# Patient Record
Sex: FEMALE | URBAN - METROPOLITAN AREA
[De-identification: names, ages, dates, MRNs, and addresses within clinical notes are randomized per-mention and may not be internally consistent; named-entity substitution may affect disease eponyms.]

---

## 2020-10-09 ENCOUNTER — OFFICE VISIT (OUTPATIENT)
Dept: URGENT CARE | Facility: CLINIC | Age: 40
End: 2020-10-09
Payer: COMMERCIAL

## 2020-10-09 VITALS
TEMPERATURE: 98.3 F | RESPIRATION RATE: 16 BRPM | OXYGEN SATURATION: 100 % | SYSTOLIC BLOOD PRESSURE: 119 MMHG | HEART RATE: 71 BPM | DIASTOLIC BLOOD PRESSURE: 65 MMHG

## 2020-10-09 DIAGNOSIS — Z20.822 SUSPECTED COVID-19 VIRUS INFECTION: Primary | ICD-10-CM

## 2020-10-09 PROCEDURE — 87635 SARS-COV-2 COVID-19 AMP PRB: CPT | Performed by: PHYSICIAN ASSISTANT

## 2020-10-09 PROCEDURE — 99203 OFFICE O/P NEW LOW 30 MIN: CPT | Performed by: PHYSICIAN ASSISTANT

## 2020-10-09 RX ORDER — ESCITALOPRAM OXALATE 10 MG/1
10 TABLET ORAL DAILY
COMMUNITY

## 2020-10-10 LAB — SARS-COV-2 RNA RESP QL NAA+PROBE: NEGATIVE

## 2025-02-28 ENCOUNTER — OFFICE VISIT (OUTPATIENT)
Dept: OBGYN CLINIC | Facility: CLINIC | Age: 45
End: 2025-02-28
Payer: COMMERCIAL

## 2025-02-28 VITALS
WEIGHT: 220 LBS | SYSTOLIC BLOOD PRESSURE: 168 MMHG | HEART RATE: 70 BPM | HEIGHT: 67 IN | DIASTOLIC BLOOD PRESSURE: 108 MMHG | BODY MASS INDEX: 34.53 KG/M2

## 2025-02-28 DIAGNOSIS — M54.12 LEFT CERVICAL RADICULOPATHY: Primary | ICD-10-CM

## 2025-02-28 PROCEDURE — 99204 OFFICE O/P NEW MOD 45 MIN: CPT | Performed by: ORTHOPAEDIC SURGERY

## 2025-02-28 RX ORDER — CYCLOBENZAPRINE HCL 10 MG
TABLET ORAL
COMMUNITY
Start: 2025-02-21

## 2025-02-28 RX ORDER — DICLOFENAC SODIUM 75 MG/1
75 TABLET, DELAYED RELEASE ORAL 2 TIMES DAILY
Qty: 30 TABLET | Refills: 0 | Status: SHIPPED | OUTPATIENT
Start: 2025-02-28 | End: 2025-03-15

## 2025-02-28 RX ORDER — METHYLPREDNISOLONE 4 MG/1
TABLET ORAL
COMMUNITY
Start: 2025-02-24

## 2025-02-28 RX ORDER — TIZANIDINE HYDROCHLORIDE 2 MG/1
CAPSULE, GELATIN COATED ORAL
COMMUNITY
Start: 2025-02-24

## 2025-02-28 NOTE — PROGRESS NOTES
Assessment/Plan:  1. Left cervical radiculopathy  Ambulatory referral to Physical Therapy    diclofenac (VOLTAREN) 75 mg EC tablet          Liset has left-sided neck pain consistent with acute cervical radiculopathy.  I advised her to stop the oral steroid due to her elevated blood pressure today.  She was also advised to call her PCP today and inform her of her elevated blood pressure and symptoms and discussed next treatment option.  She verbalized understanding of this.  If the blood pressure continues to increase or she develops chest pain or dizziness she will proceed to the emergency room.  And lieu of the steroids, we will start an oral anti-inflammatory medication and formal physical therapy for her.  Follow-up in 4 to 6 weeks for repeat evaluation.    Subjective:   Liset Fox is a 44 y.o. female who presents to the office for evaluation for acute onset of left-sided neck pain.  She denies any specific injury or trauma and woke up with significant discomfort in the left side of her neck rating down the arm.  She does work with horses and is not sure if she did 1 activity too much that may have increased this pain.  She has felt neck pain in the past but never this severe.  She did go to urgent care when the pain began and was given muscle relaxers and had an x-ray which was negative for any fractures or significant degenerative changes.  She has been taking Advil and Aleve as well.  She called her PCP and was placed on oral steroids and a different muscle relaxer a few days ago.  She feels like the steroids may have helped slightly but this did cause some adverse reaction of elevation of her blood pressure and flushing facially.  She did not take the last dose last evening of the steroid.  Today she has continued pain on the left side of her neck into her left shoulder blade and down her left arm.  She denies any weakness into the upper extremity but the numbness persists.  She cannot get  comfortable and needs to put her left arm over her head for any relief.    Review of Systems   Constitutional:  Negative for chills, fever and unexpected weight change.   HENT:  Negative for hearing loss, nosebleeds and sore throat.    Eyes:  Negative for pain, redness and visual disturbance.   Respiratory:  Negative for cough, shortness of breath and wheezing.    Cardiovascular:  Negative for chest pain, palpitations and leg swelling.   Gastrointestinal:  Negative for abdominal pain, nausea and vomiting.   Endocrine: Negative for polydipsia and polyuria.   Genitourinary:  Negative for dysuria and hematuria.   Musculoskeletal:         See HPI   Skin:  Negative for rash and wound.   Neurological:  Positive for numbness. Negative for dizziness and headaches.   Psychiatric/Behavioral:  Negative for decreased concentration and suicidal ideas. The patient is not nervous/anxious.          Past Medical History:   Diagnosis Date    Anxiety     Depression        Past Surgical History:   Procedure Laterality Date    KNEE SURGERY         Family History   Problem Relation Age of Onset    Cancer Mother         Passed away from brain cancer  at 64    Diabetes Mother         Type 1    Cancer Maternal Grandmother         Breast cancer    Cancer Paternal Grandmother         Recurring breast cancer    Cancer Maternal Aunt         Breast cancer and bone cancer       Social History     Occupational History    Not on file   Tobacco Use    Smoking status: Never    Smokeless tobacco: Never   Vaping Use    Vaping status: Never Used   Substance and Sexual Activity    Alcohol use: Yes    Drug use: Never    Sexual activity: Yes     Partners: Male     Birth control/protection: Male Sterilization         Current Outpatient Medications:     methylPREDNISolone 4 MG tablet therapy pack, , Disp: , Rfl:     cyclobenzaprine (FLEXERIL) 10 mg tablet, , Disp: , Rfl:     escitalopram (LEXAPRO) 10 mg tablet, Take 10 mg by mouth daily, Disp: , Rfl:      TiZANidine (ZANAFLEX) 2 MG capsule, , Disp: , Rfl:     No Known Allergies    Objective:  There were no vitals filed for this visit.         Ortho Exam    Physical Exam  Vitals and nursing note reviewed.   Constitutional:       Appearance: Normal appearance. She is well-developed.   HENT:      Head: Normocephalic and atraumatic.      Right Ear: External ear normal.      Left Ear: External ear normal.   Eyes:      General: No scleral icterus.     Extraocular Movements: Extraocular movements intact.      Conjunctiva/sclera: Conjunctivae normal.   Neck:        Comments: Positive Spurling's maneuver on the left.  Normal strength and sensation bilateral upper extremities.  Cardiovascular:      Rate and Rhythm: Normal rate.   Pulmonary:      Effort: Pulmonary effort is normal. No respiratory distress.   Musculoskeletal:      Cervical back: Normal range of motion and neck supple. Muscular tenderness present. No spinous process tenderness. Normal range of motion.      Comments: See Ortho exam   Skin:     General: Skin is warm and dry.   Neurological:      General: No focal deficit present.      Mental Status: She is alert and oriented to person, place, and time.   Psychiatric:         Behavior: Behavior normal.         I have personally reviewed pertinent films in PACS and my interpretation is as follows  No x-rays available for review today.  X-ray report scanned in the chart demonstrates no significant degeneration and loss of normal cervical lordosis.      This document was created using speech voice recognition software.   Grammatical errors, random word insertions, pronoun errors, and incomplete sentences are an occasional consequence of this system due to software limitations, ambient noise, and hardware issues.   Any formal questions or concerns about content, text, or information contained within the body of this dictation should be directly addressed to the provider for clarification.

## 2025-03-03 ENCOUNTER — EVALUATION (OUTPATIENT)
Dept: PHYSICAL THERAPY | Facility: CLINIC | Age: 45
End: 2025-03-03
Payer: COMMERCIAL

## 2025-03-03 DIAGNOSIS — M54.12 LEFT CERVICAL RADICULOPATHY: Primary | ICD-10-CM

## 2025-03-03 DIAGNOSIS — M54.2 NECK PAIN: ICD-10-CM

## 2025-03-03 PROCEDURE — 97162 PT EVAL MOD COMPLEX 30 MIN: CPT | Performed by: PHYSICAL THERAPIST

## 2025-03-03 PROCEDURE — 97110 THERAPEUTIC EXERCISES: CPT | Performed by: PHYSICAL THERAPIST

## 2025-03-04 NOTE — PROGRESS NOTES
PT Evaluation     Today's date: 3/3/2025  Patient name: Liset Fox  : 1980  MRN: [id number]  Referring provider: Keon Bejarano DO  Dx:   Encounter Diagnosis     ICD-10-CM    1. Left cervical radiculopathy  M54.12 Ambulatory referral to Physical Therapy      2. Neck pain  M54.2                      Assessment  Impairments: abnormal or restricted ROM, abnormal movement, activity intolerance, impaired physical strength, lacks appropriate home exercise program, pain with function, poor posture , poor body mechanics and unable to perform ADL    Assessment details: Liset Fox is a 44 y.o. female who presents with cervical pain and radicular symptoms with the associated impairments including: pain, decreased strength, decreased ROM, decreased joint mobility, and postural dysfunction. She demonstrates global ROM deficits with greatest limitations into L cervical rotation, retraction, and L side-bending.  Movements that compress the L side of her cervical spine produce pain closer to the spine, while movements that tension the L side of her cervical spine peripheralize symptoms.  She demonstrates a possible retraction directional preference to pursue over subsequent visits along with traction to reduce symptoms.  Additionally she demonstrates postural mechanics favoring forward head/flexion postures.  Due to these impairments, patient has difficulty performing ADL's, recreational activities, engaging in social activities, lifting objects related to farm activities/horses, playing with child, bending to the floor, stair negotiation, sleeping and lifting/carrying. Patient's clinical presentation is consistent with their referring diagnosis of cervical radiculopathy.     Patient has been educated in home exercise program and plan of care. Patient would likely benefit from skilled physical therapy services to address their aforementioned functional limitations through a targeted program consisting  of repeated ROM/flexibility exercises, graded strengthening to global postural muscles/graded cervical stabilization training, and graded increase in functional activity training/deep cervical neck flexor training in order to progress towards prior level of function and independence with home exercise program.   Understanding of Dx/Px/POC: good     Prognosis: good    Goals  ST-4 weeks  STG 1:Pt will demonstrate improved postural awareness to reduce daily stresses through spine in between sessions.  STG 2: Improve spinal ROM by 1/2 grade in all deficient planes to be able to lturn her head easily while driving without pain  STG 3: Pt will be I with basic HEP    LT-8 weeks  LTG 1: Pt will be able to bend forward to the floor to pick something up without pain as per PLOF  LTG 2: Pt will be able to work at a desk without pain or limitation.  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    Plan  Patient would benefit from: PT eval and skilled physical therapy  Planned modality interventions: cryotherapy, thermotherapy: hydrocollator packs, manual electrical stimulation, TENS and electrical stimulation/Russian stimulation    Planned therapy interventions: manual therapy, neuromuscular re-education, self care, therapeutic activities, therapeutic exercise, home exercise program, abdominal trunk stabilization, body mechanics training, postural training, patient education, joint mobilization, activity modification, graded exercise, graded activity, gait training, functional ROM exercises, stretching, strengthening, flexibility, graded motor, balance/weight bearing training and ADL training    Frequency: 2x week  Duration in weeks: 6  Treatment plan discussed with: patient  Plan details: HEP development, stretching, strengthening, A/AA/PROM, joint mobilizations, posture education, STM/MI as needed to reduce muscle tension,  balance and proprioceptive training, muscle reeducation, PLOC discussed and agreed upon with patient.    Subjective Evaluation    History of Present Illness  Mechanism of injury: Liset Fox is a 44 y.o. female who presents today with predominantly L sided cervical and upper trapezius region pain radiating into the arm to the elbow.  Along with numbness that radiates to the forearm and hand region that intermittently gets worse depending on head position.  This pain began 2.5 weeks ago upon waking without trauma.  She had x-rays that were unremarkable.  She has had significant swings in BP in the last few days though this was not the case when she initially went to urgent care 5 days after the initial injury.  She was placed on blood pressure medication by her primary 3 days ago.  She reports she has had previous bouts of cervical pain with intermittent radiation, but never this significant or lasting.  She is unable to find a position where the pain/discomfort is not present, it just changes in location and intensity.          Recurrent probem    Quality of life: good    Patient Goals  Patient goals for therapy: increased strength, decreased pain, return to sport/leisure activities, increased motion and independence with ADLs/IADLs  Patient goal: Bending forward without pain, sleeping, playing with child, lifting overhead.  Pain  Current pain rating: 3  At best pain rating: 3  At worst pain ratin  Quality: radiating, discomfort, dull ache and throbbing  Relieving factors: change in position (to some degree)  Aggravating factors: sitting and lifting (bending forward, picking things up off the floor)  Progression: no change    Social Support  Lives in: multiple-level home  Lives with: young children    Employment status: working  Hand dominance: right  Exercise history: Works with horses, lifting objects from the floor  Life stress: young child and dogs    Treatments  Current treatment: physical  therapy    Objective     Postural Observations  Seated posture: poor  Standing posture: poor      Active Range of Motion   Cervical/Thoracic Spine       Cervical  Subcranial protraction: Active cervical subcranial protraction: radiating to elbow.  with pain   Restriction level: minimal  Subcranial retraction: Active cervical subcranial retraction: pain in the neck.  with pain   Restriction level: maximal  Flexion:  Restriction level: minimal  Extension:  with pain Restriction level: moderate  Left lateral flexion: 25 degrees     with pain  Right lateral flexion: 35 degrees     with pain  Left rotation: 54 (about 50% left rotation when isolated for upper cervical) degrees  Right rotation: 75 degrees       Joint Play     Hypomobile: C4, C5, C6, C7, T1 and T2     Pain: C6, C7 and T1   Mechanical Assessment    Cervical    Seated Protrusion: sustained positions   Pain location: peripheralized  Pain intensity: worse  Seated retraction: repeated movements   Pain location: centralized  Pain intensity: worse  Pain level: produced    Thoracic      Lumbar      Strength/Myotome Testing     Left Shoulder     Planes of Motion   Flexion: 4-   Abduction: 4-   External rotation at 0°: 4-     Right Shoulder     Planes of Motion   Flexion: 4+   Abduction: 4+   External rotation at 0°: 4+     Left Elbow   Flexion: 4  Extension: 4-    Right Elbow   Flexion: 4+  Extension: 4    Left Wrist/Hand   Wrist extension: 4  Wrist flexion: 4  Thumb extension: 4    Right Wrist/Hand   Wrist extension: 4  Wrist flexion: 4  Thumb extension: 4    Tests   Cervical   Positive neck flexor muscle endurance test.    Left   Positive Spurling's Test A and cervical flexion-rotation test .     Right   Positive Spurling's Test A.     Left Shoulder   Positive ULTT1.   Graphical documentation:         BP At Eval:  Seated: 134/104  Supine: 131/94       Precautions:   Past Medical History:   Diagnosis Date    Anxiety     Depression         Insurance:  A/CMS Eval/  Re-eval Auth #/ Referral # Total units or visits Start date  Expiration date KX? Visit limitation?  PT only or  PT+OT? Co-Insurance   CMS 3/3/25   3/3/25                      POC Start Date POC Expiration Date Signed POC?   3/3/25 5/2/25       Date 3/3              Visits/Units:  Used 1              Authed:  Remaining                  [employer] Access:  Access Code: [email or web address]  URL: [email or web address]  Date: 03/03/2025  Prepared by: Marv Maurer    Exercises  - Supine Chin Tuck  - 4 x daily - 7 x weekly - 2 sets - 10 reps  - Seated Cervical Retraction  - 4 x daily - 7 x weekly - 2 sets - 10 reps    Monitor BP!    Manuals   3/3   C/S Traction                        Neuro Re-Ed      C/S Ret   HEP supine   C/S Ret                                    Ther Ex      Assessment & Management   POC with emphasis on directional preference and centralization   Seated T/S Ext      Sidelying T/S Rot      C/S SNAGs                              Ther Activity                  Gait Training                  Modalities

## 2025-03-05 ENCOUNTER — OFFICE VISIT (OUTPATIENT)
Dept: PHYSICAL THERAPY | Facility: CLINIC | Age: 45
End: 2025-03-05
Payer: COMMERCIAL

## 2025-03-05 DIAGNOSIS — M54.12 LEFT CERVICAL RADICULOPATHY: Primary | ICD-10-CM

## 2025-03-05 DIAGNOSIS — M54.2 NECK PAIN: ICD-10-CM

## 2025-03-05 PROCEDURE — 97140 MANUAL THERAPY 1/> REGIONS: CPT | Performed by: PHYSICAL THERAPIST

## 2025-03-05 PROCEDURE — 97110 THERAPEUTIC EXERCISES: CPT | Performed by: PHYSICAL THERAPIST

## 2025-03-05 NOTE — PROGRESS NOTES
Daily Note     Today's date: 3/5/2025  Patient name: Liset Fox  : 1980  MRN: [id number]  Referring provider: Keon Bejarano DO  Dx:   Encounter Diagnosis     ICD-10-CM    1. Left cervical radiculopathy  M54.12       2. Neck pain  M54.2                      Subjective: Patient reports she felt some relief yesterday from the exercises, but she feels about the same today.      Objective: See treatment diary below    BP Supine: 128/82    BP Post Treat Seated: 130/88      Assessment: Tolerated treatment well. Patient continues to demonstrate possible directional preference with retraction.  Educated to continue in supine as symptoms centralized in that position.  Mild relief from traction, but pain returned with rotation.  Patient demonstrated fatigue post treatment, exhibited good technique with therapeutic exercises, and would benefit from continued PT      Plan: Continue per plan of care.  Progress treatment as tolerated.  Progress challenge as appropriate with irritability.     Precautions:   Past Medical History:   Diagnosis Date    Anxiety     Depression         Insurance:  [place]/CMS Eval/ Re-eval Auth #/ Referral # Total units or visits Start date  Expiration date KX? Visit limitation?  PT only or  PT+OT? Co-Insurance   CMS 3/3/25   3/3/25                      POC Start Date POC Expiration Date Signed POC?   3/3/25 5/2/25       Date 3/3 3/5             Visits/Units:  Used 1 2             Authed:  Remaining                  be[employer] Access:  Access Code: [email or web address]  URL: [email or web address]  Date: 2025  Prepared by: Marv Maurer    Exercises  - Supine Chin Tuck  - 4 x daily - 7 x weekly - 2 sets - 10 reps  - Seated Cervical Retraction  - 4 x daily - 7 x weekly - 2 sets - 10 reps    Monitor BP!    Manuals  3/5 3/3   C/S Traction  BR gr 1-2                      Neuro Re-Ed      C/S Ret  2x10 supine HEP supine   C/S Ret  10x seated                                  Ther Ex       Assessment & Management  POC with emphasis on gravity minimized retraction with careful monitoring of symptom response.   POC with emphasis on directional preference and centralization   Seated T/S Ext      Sidelying T/S Rot      C/S SNAGs  L rot 2x10                            Ther Activity                  Gait Training                  Modalities

## 2025-03-10 ENCOUNTER — OFFICE VISIT (OUTPATIENT)
Dept: PHYSICAL THERAPY | Facility: CLINIC | Age: 45
End: 2025-03-10
Payer: COMMERCIAL

## 2025-03-10 DIAGNOSIS — M54.2 NECK PAIN: ICD-10-CM

## 2025-03-10 DIAGNOSIS — M54.12 LEFT CERVICAL RADICULOPATHY: Primary | ICD-10-CM

## 2025-03-10 PROCEDURE — 97112 NEUROMUSCULAR REEDUCATION: CPT | Performed by: PHYSICAL THERAPIST

## 2025-03-10 PROCEDURE — 97140 MANUAL THERAPY 1/> REGIONS: CPT | Performed by: PHYSICAL THERAPIST

## 2025-03-10 PROCEDURE — 97110 THERAPEUTIC EXERCISES: CPT | Performed by: PHYSICAL THERAPIST

## 2025-03-10 NOTE — PROGRESS NOTES
Daily Note     Today's date: 3/10/2025  Patient name: Liset Fox  : 1980  MRN: [id number]  Referring provider: Keon Bejarano DO  Dx:   Encounter Diagnosis     ICD-10-CM    1. Left cervical radiculopathy  M54.12       2. Neck pain  M54.2                      Subjective: Patient reports she is feeling better, her symptoms are traveling less down her arm than they were last week.      Objective: See treatment diary below    BP Sitting (Before): 124/86, (After): 118/90  BP Supine: 118/82      Assessment: Tolerated treatment well. Patient demonstrates possible extension response as symptoms have centralized.  Emphasized retraction with extension to reduce symptoms as she continues to have symptoms into the the lower cervical region.  Patient demonstrated fatigue post treatment, exhibited good technique with therapeutic exercises, and would benefit from continued PT      Plan: Continue per plan of care.  Progress treatment as tolerated.  Progress challenge as appropriate with irritability.     Precautions:   Past Medical History:   Diagnosis Date    Anxiety     Depression         Insurance:  [place]/CMS Eval/ Re-eval Auth #/ Referral # Total units or visits Start date  Expiration date KX? Visit limitation?  PT only or  PT+OT? Co-Insurance   CMS 3/3/25   3/3/25                      POC Start Date POC Expiration Date Signed POC?   3/3/25 5/2/25       Date 3/3 3/5 3/10            Visits/Units:  Used 1 2 3            Authed:  Remaining                  User[employer] Live Chat Access:  Access Code: [email or web address]  URL: [email or web address]  Date: 2025  Prepared by: Marv Maurer    Exercises  - Supine Chin Tuck  - 4 x daily - 7 x weekly - 2 sets - 10 reps  - Seated Cervical Retraction  - 4 x daily - 7 x weekly - 2 sets - 10 reps    Monitor BP!    Manuals 3/10 3/5 3/3   C/S Traction BR gr 1-2 BR gr 1-2                      Neuro Re-Ed      C/S Ret 2x10 supine 2x10 supine HEP supine   C/S Ret 10x 2 seated w/  ext 10x seated                                  Ther Ex      Assessment & Management POC with emphasis on centralization and monitoring response. POC with emphasis on gravity minimized retraction with careful monitoring of symptom response.   POC with emphasis on directional preference and centralization   Seated T/S Ext      Sidelying T/S Rot      C/S SNAGs L rot 2x10 L rot 2x10    C/S Ret w/ Rot 10x                       Ther Activity                  Gait Training                  Modalities

## 2025-03-13 ENCOUNTER — OFFICE VISIT (OUTPATIENT)
Dept: PHYSICAL THERAPY | Facility: CLINIC | Age: 45
End: 2025-03-13
Payer: COMMERCIAL

## 2025-03-13 DIAGNOSIS — M54.2 NECK PAIN: ICD-10-CM

## 2025-03-13 DIAGNOSIS — M54.12 LEFT CERVICAL RADICULOPATHY: Primary | ICD-10-CM

## 2025-03-13 PROCEDURE — 97110 THERAPEUTIC EXERCISES: CPT | Performed by: PHYSICAL THERAPIST

## 2025-03-13 PROCEDURE — 97140 MANUAL THERAPY 1/> REGIONS: CPT | Performed by: PHYSICAL THERAPIST

## 2025-03-13 NOTE — PROGRESS NOTES
Daily Note     Today's date: 3/13/2025  Patient name: Liset Fox  : 1980  MRN: [id number]  Referring provider: Keon Bejarano DO  Dx:   Encounter Diagnosis     ICD-10-CM    1. Left cervical radiculopathy  M54.12       2. Neck pain  M54.2                      Subjective: Patient reports she feels improvement steadily, and feels about 90% of her normal, though she likely has pain at rest.      Objective: See treatment diary below  BP seated Initial: 124/86  BP seated post: 134/98 (L) 126/88 R  BP supine: 114/79    Assessment: Tolerated treatment well. Patient continues to demonstrate centralization, though emphasized frequent repetitions to attempt complete reduction of symptoms.  Patient demonstrated fatigue post treatment, exhibited good technique with therapeutic exercises, and would benefit from continued PT      Plan: Continue per plan of care.  Progress treatment as tolerated.  Progress challenge as appropriate with irritability.     Precautions:   Past Medical History:   Diagnosis Date    Anxiety     Depression         Insurance:  [place]/CMS Eval/ Re-eval Auth #/ Referral # Total units or visits Start date  Expiration date KX? Visit limitation?  PT only or  PT+OT? Co-Insurance   CMS 3/3/25   3/3/25                      POC Start Date POC Expiration Date Signed POC?   3/3/25 5/2/25       Date 3/3 3/5 3/10 3/13           Visits/Units:  Used 1 2 3 4           Authed:  Remaining                  [employer] Access:  Access Code: [email or web address]  URL: [email or web address]  Date: 2025  Prepared by: Marv Maurer    Exercises  - Supine Chin Tuck  - 4 x daily - 7 x weekly - 2 sets - 10 reps  - Seated Cervical Retraction  - 4 x daily - 7 x weekly - 2 sets - 10 reps    Monitor BP!    Manuals 3/13 3/10 3/5 3/3   C/S Traction BR gr 1-2 BR gr 1-2 BR gr 1-2    C/S Retraction-Ext 8x w/ mild retraction/traction                    Neuro Re-Ed       C/S Ret 2x10 w/ retraction w/ scapular retraction  2x10 supine 2x10 supine HEP supine   C/S Ret  10x 2 seated w/ ext 10x seated                                       Ther Ex       Assessment & Management POC with emphasis on continued repeated retraction/extension. POC with emphasis on centralization and monitoring response. POC with emphasis on gravity minimized retraction with careful monitoring of symptom response.   POC with emphasis on directional preference and centralization   Seated T/S Ext       Sidelying T/S Rot       C/S SNAGs  L rot 2x10 L rot 2x10    C/S Ret w/ Rot  10x                          Ther Activity                     Gait Training                     Modalities

## 2025-03-17 ENCOUNTER — OFFICE VISIT (OUTPATIENT)
Dept: PHYSICAL THERAPY | Facility: CLINIC | Age: 45
End: 2025-03-17
Payer: COMMERCIAL

## 2025-03-17 DIAGNOSIS — M54.12 LEFT CERVICAL RADICULOPATHY: Primary | ICD-10-CM

## 2025-03-17 DIAGNOSIS — M54.2 NECK PAIN: ICD-10-CM

## 2025-03-17 PROCEDURE — 97140 MANUAL THERAPY 1/> REGIONS: CPT | Performed by: PHYSICAL THERAPIST

## 2025-03-17 PROCEDURE — 97110 THERAPEUTIC EXERCISES: CPT | Performed by: PHYSICAL THERAPIST

## 2025-03-17 PROCEDURE — 97112 NEUROMUSCULAR REEDUCATION: CPT | Performed by: PHYSICAL THERAPIST

## 2025-03-17 NOTE — PROGRESS NOTES
sDaily Note     Today's date: 3/17/2025  Patient name: Liset Fox  : 1980  MRN: [id number]  Referring provider: Keon Bejarano DO  Dx:   Encounter Diagnosis     ICD-10-CM    1. Left cervical radiculopathy  M54.12       2. Neck pain  M54.2                      Subjective: Patient reports she feels she is improving.  She is able to go throughout her day with less pain in the arm, though she continues to have weakness.      Objective: See treatment diary below  BP: Prior seated: 132/92, After seated: 127/82  BP supine: 128/78      Assessment: Tolerated treatment well. Patient continues to demonstrate rotational deficits along with signs of nerve impingement while testing her L shoulder.  She would likely benefit from continued progression of retraction exercises to reduce impingement.  Patient demonstrated fatigue post treatment, exhibited good technique with therapeutic exercises, and would benefit from continued PT      Plan: Continue per plan of care.  Progress treatment as tolerated.  Progress challenge as appropriate with irritability.     Precautions:   Past Medical History:   Diagnosis Date    Anxiety     Depression         Insurance:  [place]/CMS Eval/ Re-eval Auth #/ Referral # Total units or visits Start date  Expiration date KX? Visit limitation?  PT only or  PT+OT? Co-Insurance   CMS 3/3/25   3/3/25                      POC Start Date POC Expiration Date Signed POC?   3/3/25 5/2/25       Date 3/3 3/5 3/10 3/13 3/17          Visits/Units:  Used 1 2 3 4 5          Authed:  Remaining                  [employer] Access:  Access Code: [email or web address]  URL: [email or web address]  Date: 2025  Prepared by: Marv Maurer    Exercises  - Supine Chin Tuck  - 4 x daily - 7 x weekly - 2 sets - 10 reps  - Seated Cervical Retraction  - 4 x daily - 7 x weekly - 2 sets - 10 reps    Monitor BP!    Manuals 3/17 3/13 3/10 3/5 3/3   C/S Traction BR gr 1-2 BR gr 1-2 BR gr 1-2 BR gr 1-2    C/S  Retraction-Ext 10x w/ mild retraction into extension 8x w/ mild retraction/traction                      Neuro Re-Ed        C/S Ret 2x10 w/ retraction w/ scapular retraction 2x10 w/ retraction w/ scapular retraction 2x10 supine 2x10 supine HEP supine   C/S Ret   10x 2 seated w/ ext 10x seated                                            Ther Ex        Assessment & Management POC with emphasis on retraction and rotation POC with emphasis on continued repeated retraction/extension. POC with emphasis on centralization and monitoring response. POC with emphasis on gravity minimized retraction with careful monitoring of symptom response.   POC with emphasis on directional preference and centralization   Seated T/S Ext        Sidelying T/S Rot        C/S SNAGs   L rot 2x10 L rot 2x10    C/S Ret w/ Rot 2x10 seated to L  10x                             Ther Activity                        Gait Training                        Modalities

## 2025-03-20 ENCOUNTER — OFFICE VISIT (OUTPATIENT)
Dept: PHYSICAL THERAPY | Facility: CLINIC | Age: 45
End: 2025-03-20
Payer: COMMERCIAL

## 2025-03-20 DIAGNOSIS — M54.2 NECK PAIN: ICD-10-CM

## 2025-03-20 DIAGNOSIS — M54.12 LEFT CERVICAL RADICULOPATHY: Primary | ICD-10-CM

## 2025-03-20 PROCEDURE — 97112 NEUROMUSCULAR REEDUCATION: CPT | Performed by: PHYSICAL THERAPIST

## 2025-03-20 PROCEDURE — 97140 MANUAL THERAPY 1/> REGIONS: CPT | Performed by: PHYSICAL THERAPIST

## 2025-03-20 PROCEDURE — 97110 THERAPEUTIC EXERCISES: CPT | Performed by: PHYSICAL THERAPIST

## 2025-03-20 NOTE — PROGRESS NOTES
Daily Note     Today's date: 3/20/2025  Patient name: Liset Fox  : 1980  MRN: [id number]  Referring provider: Keon Bejarano DO  Dx:   Encounter Diagnosis     ICD-10-CM    1. Left cervical radiculopathy  M54.12       2. Neck pain  M54.2                      Subjective: Patient reports she feels about the same overall, she feels as though she is back to baseline, though she has had numbness of the hands over the past few days.      Objective: See treatment diary below      Assessment: Tolerated treatment well. Discussed positioning/posture and possible prolonged extension based radicular symptoms with sleeping.  She continues to have reduction of symptoms with retraction and rotation to the L.  Patient demonstrated fatigue post treatment, exhibited good technique with therapeutic exercises, and would benefit from continued PT      Plan: Continue per plan of care.  Progress treatment as tolerated.  Progress challenge as appropriate with irritability.     Precautions:   Past Medical History:   Diagnosis Date    Anxiety     Depression         Insurance:  [place]/CMS Eval/ Re-eval Auth #/ Referral # Total units or visits Start date  Expiration date KX? Visit limitation?  PT only or  PT+OT? Co-Insurance   CMS 3/3/25   3/3/25                      POC Start Date POC Expiration Date Signed POC?   3/3/25 5/2/25       Date 3/3 3/5 3/10 3/13 3/17 3/20         Visits/Units:  Used 1 2 3 4 5 6         Authed:  Remaining                  [employer] Access:  Access Code: [email or web address]  URL: [email or web address]  Date: 2025  Prepared by: Marv Maurer    Exercises  - Supine Chin Tuck  - 4 x daily - 7 x weekly - 2 sets - 10 reps  - Seated Cervical Retraction  - 4 x daily - 7 x weekly - 2 sets - 10 reps    Monitor BP!    Manuals 3/20 3/17 3/13 3/10 3/5 3/3   C/S Traction BR gr 1-2 BR gr 1-2 BR gr 1-2 BR gr 1-2 BR gr 1-2    C/S Retraction-Ext 10x w/ mild retraction 10x w/ mild retraction into extension  "8x w/ mild retraction/traction                        Neuro Re-Ed         C/S Ret 2x10 w/ retraction w/ scapular retraction 2x10 w/ retraction w/ scapular retraction 2x10 w/ retraction w/ scapular retraction 2x10 supine 2x10 supine HEP supine   C/S Ret    10x 2 seated w/ ext 10x seated                                                 Ther Ex         Assessment & Management POC with prolonged extension inducing radicular symptoms. POC with emphasis on retraction and rotation POC with emphasis on continued repeated retraction/extension. POC with emphasis on centralization and monitoring response. POC with emphasis on gravity minimized retraction with careful monitoring of symptom response.   POC with emphasis on directional preference and centralization   Seated T/S Ext         Sidelying T/S Rot         C/S SNAGs    L rot 2x10 L rot 2x10    C/S Ret w/ Rot 2x10 seated L 2x10 seated to L  10x     LS Str 3x10\" ea                          Ther Activity                           Gait Training                           Modalities                                          "

## 2025-03-21 ENCOUNTER — OFFICE VISIT (OUTPATIENT)
Dept: OBGYN CLINIC | Facility: CLINIC | Age: 45
End: 2025-03-21
Payer: COMMERCIAL

## 2025-03-21 VITALS — BODY MASS INDEX: 32.96 KG/M2 | WEIGHT: 210 LBS | HEIGHT: 67 IN

## 2025-03-21 DIAGNOSIS — M54.12 LEFT CERVICAL RADICULOPATHY: Primary | ICD-10-CM

## 2025-03-21 PROCEDURE — 99213 OFFICE O/P EST LOW 20 MIN: CPT | Performed by: ORTHOPAEDIC SURGERY

## 2025-03-21 RX ORDER — AMLODIPINE BESYLATE 5 MG/1
TABLET ORAL
COMMUNITY
Start: 2025-02-28

## 2025-03-21 NOTE — PROGRESS NOTES
Assessment/Plan:  1. Left cervical radiculopathy            Liste has persistent neck pain but not as severe as initially presented.  She is tolerating therapy.  I recommended 3 more weeks of therapy and follow-up in the office if pain persist.  Further imaging with MRI could be considered.    Subjective:   Liset Fox is a 44 y.o. female who presents to the office for follow-up for left-sided cervical radiculopathy.  She was last seen in the office 1 month ago with pain down the left arm.  She has undergone 3 weeks of therapy and reports improvement with less radicular symptoms.  She still feels pain at night sometimes it goes down the arm.  She has less pain in the neck as well with range of motion.      Review of Systems   Constitutional:  Negative for chills, fever and unexpected weight change.   HENT:  Negative for hearing loss, nosebleeds and sore throat.    Eyes:  Negative for pain, redness and visual disturbance.   Respiratory:  Negative for cough, shortness of breath and wheezing.    Cardiovascular:  Negative for chest pain, palpitations and leg swelling.   Gastrointestinal:  Negative for abdominal pain, nausea and vomiting.   Endocrine: Negative for polydipsia and polyuria.   Genitourinary:  Negative for dysuria and hematuria.   Musculoskeletal:         See HPI   Skin:  Negative for rash and wound.   Neurological:  Negative for dizziness, numbness and headaches.   Psychiatric/Behavioral:  Negative for decreased concentration and suicidal ideas. The patient is not nervous/anxious.          Past Medical History:   Diagnosis Date    Anxiety     Depression        Past Surgical History:   Procedure Laterality Date    KNEE SURGERY         Family History   Problem Relation Age of Onset    Cancer Mother         Passed away from brain cancer  at 64    Diabetes Mother         Type 1    Cancer Maternal Grandmother         Breast cancer    Cancer Paternal Grandmother         Recurring breast cancer    Cancer  Maternal Aunt         Breast cancer and bone cancer       Social History     Occupational History    Not on file   Tobacco Use    Smoking status: Never    Smokeless tobacco: Never   Vaping Use    Vaping status: Never Used   Substance and Sexual Activity    Alcohol use: Yes    Drug use: Never    Sexual activity: Yes     Partners: Male     Birth control/protection: Male Sterilization         Current Outpatient Medications:     amLODIPine (NORVASC) 5 mg tablet, , Disp: , Rfl:     No Known Allergies    Objective:  There were no vitals filed for this visit.         Ortho Exam    Physical Exam  Vitals and nursing note reviewed.   Constitutional:       Appearance: Normal appearance. She is well-developed.   HENT:      Head: Normocephalic and atraumatic.      Right Ear: External ear normal.      Left Ear: External ear normal.   Eyes:      General: No scleral icterus.     Extraocular Movements: Extraocular movements intact.      Conjunctiva/sclera: Conjunctivae normal.   Cardiovascular:      Rate and Rhythm: Normal rate.   Pulmonary:      Effort: Pulmonary effort is normal. No respiratory distress.   Musculoskeletal:      Cervical back: Normal range of motion and neck supple. Muscular tenderness present. No spinous process tenderness. Normal range of motion.      Comments: See Ortho exam   Skin:     General: Skin is warm and dry.   Neurological:      General: No focal deficit present.      Mental Status: She is alert and oriented to person, place, and time.   Psychiatric:         Behavior: Behavior normal.             This document was created using speech voice recognition software.   Grammatical errors, random word insertions, pronoun errors, and incomplete sentences are an occasional consequence of this system due to software limitations, ambient noise, and hardware issues.   Any formal questions or concerns about content, text, or information contained within the body of this dictation should be directly addressed to the  provider for clarification.

## 2025-03-24 ENCOUNTER — APPOINTMENT (OUTPATIENT)
Dept: PHYSICAL THERAPY | Facility: CLINIC | Age: 45
End: 2025-03-24
Payer: COMMERCIAL

## 2025-03-27 ENCOUNTER — OFFICE VISIT (OUTPATIENT)
Dept: PHYSICAL THERAPY | Facility: CLINIC | Age: 45
End: 2025-03-27
Payer: COMMERCIAL

## 2025-03-27 DIAGNOSIS — M54.2 NECK PAIN: ICD-10-CM

## 2025-03-27 DIAGNOSIS — M54.12 LEFT CERVICAL RADICULOPATHY: Primary | ICD-10-CM

## 2025-03-27 PROCEDURE — 97140 MANUAL THERAPY 1/> REGIONS: CPT | Performed by: PHYSICAL THERAPIST

## 2025-03-27 PROCEDURE — 97112 NEUROMUSCULAR REEDUCATION: CPT | Performed by: PHYSICAL THERAPIST

## 2025-03-27 PROCEDURE — 97110 THERAPEUTIC EXERCISES: CPT | Performed by: PHYSICAL THERAPIST

## 2025-03-27 NOTE — PROGRESS NOTES
Daily Note     Today's date: 3/27/2025  Patient name: Liset Fox  : 1980  MRN: [id number]  Referring provider: Keon Bejarano DO  Dx:   Encounter Diagnosis     ICD-10-CM    1. Left cervical radiculopathy  M54.12       2. Neck pain  M54.2                      Subjective: Patient reports she feels about the same overall, she continues to have minimal pain radiating down the arm.      Objective: See treatment diary below      Assessment: Tolerated treatment well. Patient continues to progress well with cervical stabilization training along with global mobility of the cervical spine.  Patient demonstrated fatigue post treatment, exhibited good technique with therapeutic exercises, and would benefit from continued PT      Plan: Continue per plan of care.  Progress treatment as tolerated.  Progress challenge as appropriate with irritability.     Precautions:   Past Medical History:   Diagnosis Date    Anxiety     Depression         Insurance:  [place]/CMS Eval/ Re-eval Auth #/ Referral # Total units or visits Start date  Expiration date KX? Visit limitation?  PT only or  PT+OT? Co-Insurance   CMS 3/3/25   3/3/25                      POC Start Date POC Expiration Date Signed POC?   3/3/25 5/2/25       Date 3/3 3/5 3/10 3/13 3/17 3/20 3/27        Visits/Units:  Used 1 2 3 4 5 6 7        Authed:  Remaining                  [employer] Access:  Access Code: [email or web address]  URL: [email or web address]  Date: 2025  Prepared by: Marv Maurer    Exercises  - Supine Chin Tuck  - 4 x daily - 7 x weekly - 2 sets - 10 reps  - Seated Cervical Retraction  - 4 x daily - 7 x weekly - 2 sets - 10 reps    Monitor BP!    Manuals 3/27 3/20 3/17 3/13 3/10 3/5 3/3   C/S Traction BR gr 1-2 BR gr 1-2 BR gr 1-2 BR gr 1-2 BR gr 1-2 BR gr 1-2    C/S Retraction-Ext 10x w/ mild retraction 10x w/ mild retraction 10x w/ mild retraction into extension 8x w/ mild retraction/traction                          Neuro Re-Ed        "   C/S Ret 2x10 w/ retraction w/ scapular retraction 2x10 w/ retraction w/ scapular retraction 2x10 w/ retraction w/ scapular retraction 2x10 w/ retraction w/ scapular retraction 2x10 supine 2x10 supine HEP supine   C/S Ret Med ball BMB 2x10 w/ retraction    10x 2 seated w/ ext 10x seated    C/S Ret Stabilization YTB 10x w/ extension-abduction,    YTB 2x10 w/ shoulder press                                                 Ther Ex          Assessment & Management POC with continued emphasis on global mobility. POC with prolonged extension inducing radicular symptoms. POC with emphasis on retraction and rotation POC with emphasis on continued repeated retraction/extension. POC with emphasis on centralization and monitoring response. POC with emphasis on gravity minimized retraction with careful monitoring of symptom response.   POC with emphasis on directional preference and centralization   Seated T/S Ext          Sidelying T/S Rot          C/S SNAGs     L rot 2x10 L rot 2x10    C/S Ret w/ Rot  2x10 seated L 2x10 seated to L  10x     LS Str 5x10\" ea 3x10\" ea        UT Str 5x10\" ea                   Ther Activity                              Gait Training                              Modalities                                               "

## 2025-03-31 ENCOUNTER — OFFICE VISIT (OUTPATIENT)
Dept: PHYSICAL THERAPY | Facility: CLINIC | Age: 45
End: 2025-03-31
Payer: COMMERCIAL

## 2025-03-31 DIAGNOSIS — M54.12 LEFT CERVICAL RADICULOPATHY: Primary | ICD-10-CM

## 2025-03-31 DIAGNOSIS — M54.2 NECK PAIN: ICD-10-CM

## 2025-03-31 PROCEDURE — 97110 THERAPEUTIC EXERCISES: CPT | Performed by: PHYSICAL THERAPIST

## 2025-03-31 PROCEDURE — 97112 NEUROMUSCULAR REEDUCATION: CPT | Performed by: PHYSICAL THERAPIST

## 2025-03-31 PROCEDURE — 97140 MANUAL THERAPY 1/> REGIONS: CPT | Performed by: PHYSICAL THERAPIST

## 2025-03-31 NOTE — PROGRESS NOTES
Daily Note     Today's date: 3/31/2025  Patient name: Liset Fox  : 1980  MRN: [id number]  Referring provider: Keon Bejarano DO  Dx:   Encounter Diagnosis     ICD-10-CM    1. Left cervical radiculopathy  M54.12       2. Neck pain  M54.2                      Subjective: Patient reports she feels about the same, no return of symptoms.      Objective: See treatment diary below      Assessment: Tolerated treatment well. Patient continues to progress well with cervical extension with reduced radiating symptoms.  She continues to be challenged by cervical stabilization exercises.  Patient demonstrated fatigue post treatment, exhibited good technique with therapeutic exercises, and would benefit from continued PT      Plan: Continue per plan of care.  Progress treatment as tolerated.  Progress challenge as appropriate with irritability.     Precautions:   Past Medical History:   Diagnosis Date    Anxiety     Depression         Insurance:  [employer]/CMS Eval/ Re-eval Auth #/ Referral # Total units or visits Start date  Expiration date KX? Visit limitation?  PT only or  PT+OT? Co-Insurance   CMS 3/3/25   3/3/25                      POC Start Date POC Expiration Date Signed POC?   3/3/25 5/2/25       Date 3/3 3/5 3/10 3/13 3/17 3/20 3/27 3/31       Visits/Units:  Used 1 2 3 4 5 6 7 8       Authed:  Remaining                  [employer] Access:  Access Code: [email or web address]  URL: [email or web address]  Date: 2025  Prepared by: Marv Maurer    Exercises  - Supine Chin Tuck  - 4 x daily - 7 x weekly - 2 sets - 10 reps  - Seated Cervical Retraction  - 4 x daily - 7 x weekly - 2 sets - 10 reps    Monitor BP!    Manuals 3/31 3/27 3/20 3/17 3/13 3/10 3/5 3/3   C/S Traction BR gr 1-2 BR gr 1-2 BR gr 1-2 BR gr 1-2 BR gr 1-2 BR gr 1-2 BR gr 1-2    C/S Retraction-Ext 10x w/ mild retraction 10x w/ mild retraction 10x w/ mild retraction 10x w/ mild retraction into extension 8x w/ mild retraction/traction     "                        Neuro Re-Ed           C/S Ret 2x10 extension  2x10 w/ retraction w/ scapular retraction 2x10 w/ retraction w/ scapular retraction 2x10 w/ retraction w/ scapular retraction 2x10 w/ retraction w/ scapular retraction 2x10 supine 2x10 supine HEP supine   C/S Ret  Med ball BMB 2x10 w/ retraction    10x 2 seated w/ ext 10x seated    C/S Ret Stabilization Prone 2x10 w/ elbows           YTB 10x w/ extension-abduction,    YTB 2x10 w/ shoulder press                                                     Ther Ex           Assessment & Management  POC with continued emphasis on global mobility. POC with prolonged extension inducing radicular symptoms. POC with emphasis on retraction and rotation POC with emphasis on continued repeated retraction/extension. POC with emphasis on centralization and monitoring response. POC with emphasis on gravity minimized retraction with careful monitoring of symptom response.   POC with emphasis on directional preference and centralization   Seated T/S Ext           Sidelying T/S Rot           C/S SNAGs      L rot 2x10 L rot 2x10    C/S Ret w/ Rot   2x10 seated L 2x10 seated to L  10x     LS Str 5x10\" ea 5x10\" ea 3x10\" ea        UT Str 5x10\" ea 5x10\" ea                    Ther Activity                                 Gait Training                                 Modalities                                                    "

## 2025-04-03 ENCOUNTER — APPOINTMENT (OUTPATIENT)
Dept: PHYSICAL THERAPY | Facility: CLINIC | Age: 45
End: 2025-04-03
Payer: COMMERCIAL

## 2025-04-07 ENCOUNTER — OFFICE VISIT (OUTPATIENT)
Dept: PHYSICAL THERAPY | Facility: CLINIC | Age: 45
End: 2025-04-07
Payer: COMMERCIAL

## 2025-04-07 DIAGNOSIS — M54.12 LEFT CERVICAL RADICULOPATHY: Primary | ICD-10-CM

## 2025-04-07 DIAGNOSIS — M54.2 NECK PAIN: ICD-10-CM

## 2025-04-07 PROCEDURE — 97110 THERAPEUTIC EXERCISES: CPT | Performed by: PHYSICAL THERAPIST

## 2025-04-07 PROCEDURE — 97112 NEUROMUSCULAR REEDUCATION: CPT | Performed by: PHYSICAL THERAPIST

## 2025-04-07 NOTE — PROGRESS NOTES
Daily Note     Today's date: 2025  Patient name: Liset Fox  : 1980  MRN: [id number]  Referring provider: Keon Bejarano DO  Dx:   Encounter Diagnosis     ICD-10-CM    1. Left cervical radiculopathy  M54.12       2. Neck pain  M54.2                      Subjective: Patient reports she has not had a return of her arm pain lately, though she continues to have numbness on waking.      Objective: See treatment diary below      Assessment: Tolerated treatment well. Patient continues to demonstrate lower cervical stiffness.  Emphasized active ROM exercises in prone on elbows to facilitate end range movements.  Patient demonstrated fatigue post treatment, exhibited good technique with therapeutic exercises, and would benefit from continued PT      Plan: Continue per plan of care.  Progress treatment as tolerated.  Progress challenge as appropriate with irritability.     Precautions:   Past Medical History:   Diagnosis Date    Anxiety     Depression         Insurance:  [employer]/CMS Eval/ Re-eval Auth #/ Referral # Total units or visits Start date  Expiration date KX? Visit limitation?  PT only or  PT+OT? Co-Insurance   CMS 3/3/25   3/3/25                      POC Start Date POC Expiration Date Signed POC?   3/3/25 5/2/25       Date 3/3 3/5 3/10 3/13 3/17 3/20 3/27 3/31 4/7      Visits/Units:  Used 1 2 3 4 5 6 7 8 9      Authed:  Remaining                  [employer] Access:  Access Code: [email or web address]  URL: [email or web address]  Date: 2025  Prepared by: Marv Maurer    Exercises  - Supine Chin Tuck  - 4 x daily - 7 x weekly - 2 sets - 10 reps  - Seated Cervical Retraction  - 4 x daily - 7 x weekly - 2 sets - 10 reps    Monitor BP!    Manuals 4/7 3/31 3/27 3/20 3/17 3/13 3/10 3/5 3/3   C/S Traction  BR gr 1-2 BR gr 1-2 BR gr 1-2 BR gr 1-2 BR gr 1-2 BR gr 1-2 BR gr 1-2    C/S Retraction-Ext  10x w/ mild retraction 10x w/ mild retraction 10x w/ mild retraction 10x w/ mild retraction into  "extension 8x w/ mild retraction/traction                              Neuro Re-Ed            C/S Ret 2x10 w/ ext 2x10 extension  2x10 w/ retraction w/ scapular retraction 2x10 w/ retraction w/ scapular retraction 2x10 w/ retraction w/ scapular retraction 2x10 w/ retraction w/ scapular retraction 2x10 supine 2x10 supine HEP supine   C/S Ret   Med ball BMB 2x10 w/ retraction    10x 2 seated w/ ext 10x seated    C/S Ret Stabilization Prone 2x10 w/ ext on elbows Prone 2x10 w/ elbows           YTB 10x w/ extension-abduction,    YTB 2x10 w/ shoulder press         Prone on Elbows C/S rotation 2x10 ea    C/S SB 2x10 ea    C/S rotation w/ SB & Ext 2x10 ea           C/S Ret 10x5\" chin curls                                   Ther Ex            Assessment & Management POC on emphasis of full ROM.  POC with continued emphasis on global mobility. POC with prolonged extension inducing radicular symptoms. POC with emphasis on retraction and rotation POC with emphasis on continued repeated retraction/extension. POC with emphasis on centralization and monitoring response. POC with emphasis on gravity minimized retraction with careful monitoring of symptom response.   POC with emphasis on directional preference and centralization   Seated T/S Ext 10x5s           Sidelying T/S Rot            C/S SNAGs       L rot 2x10 L rot 2x10    C/S Ret w/ Rot    2x10 seated L 2x10 seated to L  10x     LS Str  5x10\" ea 5x10\" ea 3x10\" ea        UT Str  5x10\" ea 5x10\" ea                     Ther Activity                                    Gait Training                                    Modalities                                                         "

## 2025-04-10 ENCOUNTER — OFFICE VISIT (OUTPATIENT)
Dept: PHYSICAL THERAPY | Facility: CLINIC | Age: 45
End: 2025-04-10
Payer: COMMERCIAL

## 2025-04-10 DIAGNOSIS — M54.2 NECK PAIN: ICD-10-CM

## 2025-04-10 DIAGNOSIS — M54.12 LEFT CERVICAL RADICULOPATHY: Primary | ICD-10-CM

## 2025-04-10 PROCEDURE — 97110 THERAPEUTIC EXERCISES: CPT | Performed by: PHYSICAL THERAPIST

## 2025-04-10 PROCEDURE — 97112 NEUROMUSCULAR REEDUCATION: CPT | Performed by: PHYSICAL THERAPIST

## 2025-04-10 NOTE — PROGRESS NOTES
PT Re-Evaluation     Today's date: 4/10/2025  Patient name: Liset Fox  : 1980  MRN: [id number]  Referring provider: Keon Bejarano DO  Dx:   Encounter Diagnosis     ICD-10-CM    1. Left cervical radiculopathy  M54.12       2. Neck pain  M54.2                      Assessment  Impairments: abnormal or restricted ROM, abnormal movement, activity intolerance, impaired physical strength, lacks appropriate home exercise program, pain with function, poor posture , poor body mechanics and unable to perform ADL    Assessment details: Liset Fox is a 44 y.o. female who presents with cervical pain and radicular symptoms with the associated impairments.  She demonstrates improvement in radicular symptoms with daily activities.  She demonstrates improved cervical retraction and extension ROM.  She demonstrates bilateral numbness of the forearms and hands consistently each night with sleeping, so we have emphasized global mobility of the cervical spine.  Recommended follow up with Dr. Bejarano as these numbness symptoms have persisted with sleep.  She continues to demonstrate limitation with deep cervical neck flexor endurance.  Due to these impairments, patient continues have difficulty performing ADL's, recreational activities, engaging in social activities, lifting objects related to farm activities/horses, playing with child, bending to the floor, stair negotiation, and sleeping.     Patient would likely benefit from continued skilled physical therapy services to address their aforementioned functional limitations through a targeted program consisting of repeated ROM/flexibility exercises, graded strengthening to global postural muscles/graded cervical stabilization training, and graded increase in functional activity training/deep cervical neck flexor training in order to progress towards prior level of function and independence with home exercise program.   Understanding of Dx/Px/POC: good      Prognosis: good    Goals  ST-4 weeks  STG 1:Pt will demonstrate improved postural awareness to reduce daily stresses through spine in between sessions.-progressing towards  STG 2: Improve spinal ROM by 1/2 grade in all deficient planes to be able to turn her head easily while driving without pain-progressing towards  STG 3: Pt will be I with basic HEP-met    LT-8 weeks  LTG 1: Pt will be able to bend forward to the floor to pick something up without pain as per PLOF -met  LTG 2: Pt will be able to work at a desk without pain or limitation. -met  LTG 3: Pt will deny sleep disturbances secondary to neck pain  -progressing towards  LTG 4: Pt will be able to ride/lift objects for houses without limitation. -progressing towards    Plan  Patient would benefit from: PT eval and skilled physical therapy  Planned modality interventions: cryotherapy, thermotherapy: hydrocollator packs, manual electrical stimulation, TENS and electrical stimulation/Russian stimulation    Planned therapy interventions: manual therapy, neuromuscular re-education, self care, therapeutic activities, therapeutic exercise, home exercise program, abdominal trunk stabilization, body mechanics training, postural training, patient education, joint mobilization, activity modification, graded exercise, graded activity, gait training, functional ROM exercises, stretching, strengthening, flexibility, graded motor, balance/weight bearing training and ADL training    Frequency: 2x week  Duration in weeks: 8  Treatment plan discussed with: patient  Plan details: HEP development, stretching, strengthening, A/AA/PROM, joint mobilizations, posture education, STM/MI as needed to reduce muscle tension, balance and proprioceptive training, muscle reeducation, PLOC discussed and agreed upon with patient.    Subjective Evaluation    History of Present Illness  Mechanism of injury: Liset Fox is a 44 y.o. female who presents today with predominantly L  sided cervical and upper trapezius region pain radiating into the arm to the elbow.  She reports that the radiating pain and weakness has decreased significantly.  However, she has had persistent/recurrent numbness of the forearms and hands that wakes her up multiple times throughout the night.  She reports that this has been occurring for an extended period of time to this point.            Recurrent probem    Quality of life: good    Patient Goals  Patient goals for therapy: increased strength, decreased pain, return to sport/leisure activities, increased motion and independence with ADLs/IADLs  Patient goal: Bending forward without pain, sleeping, playing with child, lifting overhead.  Pain  Current pain ratin  At best pain ratin  At worst pain ratin  Quality: radiating, discomfort, dull ache and throbbing  Relieving factors: change in position (to some degree)  Aggravating factors: sitting and lifting (bending forward, picking things up off the floor)  Progression: no change    Social Support  Lives in: multiple-level home  Lives with: young children    Employment status: working  Hand dominance: right  Exercise history: Works with horses, lifting objects from the floor  Life stress: young child and dogs    Treatments  Current treatment: physical therapy    Objective     Postural Observations  Seated posture: poor  Standing posture: poor      Active Range of Motion   Cervical/Thoracic Spine       Cervical  Subcranial protraction: Active cervical subcranial protraction: radiating to elbow.  with pain   Restriction level: minimal  Subcranial retraction: Active cervical subcranial retraction: pain in the neck.  with pain   Restriction level: maximal  Flexion:  Restriction level: minimal  Extension: 20 with pain Restriction level: moderate  Left lateral flexion: 45 degrees     with pain  Right lateral flexion: 45 degrees     with pain  Left rotation: 68 (about 75% left rotation when isolated for upper  cervical) degrees  Right rotation: 75 degrees       Joint Play     Hypomobile: C6, C7, T1 and T2     Pain: C6, C7 and T1   Mechanical Assessment    Cervical    Seated Protrusion: sustained positions   Pain location: peripheralized  Pain intensity: worse  Seated retraction: repeated movements   Pain location: centralized  Pain intensity: worse  Pain level: produced    Thoracic      Lumbar      Strength/Myotome Testing     Left Shoulder     Planes of Motion   Flexion: 4+  Abduction: 4   External rotation at 0°: 4    Right Shoulder     Planes of Motion   Flexion: 4+   Abduction: 4+   External rotation at 0°: 4+     Left Elbow   Flexion: 4  Extension: 4    Right Elbow   Flexion: 4+  Extension: 4    Left Wrist/Hand   Wrist extension: 4  Wrist flexion: 4  Thumb extension: 4    Right Wrist/Hand   Wrist extension: 4  Wrist flexion: 4  Thumb extension: 4    Tests   Cervical   Positive neck flexor muscle endurance test.    Left   Positive Spurling's Test A and cervical flexion-rotation test .     Right   Positive Spurling's Test A.     Left Shoulder   Positive ULTT1.   Graphical documentation:              Precautions:   Past Medical History:   Diagnosis Date    Anxiety     Depression         Insurance:  [place]/CMS Eval/ Re-eval Auth #/ Referral # Total units or visits Start date  Expiration date KX? Visit limitation?  PT only or  PT+OT? Co-Insurance   CMS 3/3/25   3/3/25                      POC Start Date POC Expiration Date Signed POC?   3/3/25 5/2/25    4/10/25 6/9/25       Date 3/3 3/5 3/10 3/13 3/17 3/20 3/27 3/31 4/7 4/10     Visits/Units:  Used 1 2 3 4 5 6 7 8 9 10     Authed:  Remaining                  [employer] Access:  Access Code: [email or web address]  URL: [email or web address]  Date: 03/03/2025  Prepared by: Marv Maurer    Exercises  - Supine Chin Tuck  - 4 x daily - 7 x weekly - 2 sets - 10 reps  - Seated Cervical Retraction  - 4 x daily - 7 x weekly - 2 sets - 10 reps    Monitor BP!    Manuals 4/10 4/7 3/31  "3/27 3/20 3/17 3/13 3/10 3/5 3/3   C/S Traction   BR gr 1-2 BR gr 1-2 BR gr 1-2 BR gr 1-2 BR gr 1-2 BR gr 1-2 BR gr 1-2    C/S Retraction-Ext   10x w/ mild retraction 10x w/ mild retraction 10x w/ mild retraction 10x w/ mild retraction into extension 8x w/ mild retraction/traction                                Neuro Re-Ed             C/S Ret 2x10 w/ ext 2x10 w/ ext 2x10 extension  2x10 w/ retraction w/ scapular retraction 2x10 w/ retraction w/ scapular retraction 2x10 w/ retraction w/ scapular retraction 2x10 w/ retraction w/ scapular retraction 2x10 supine 2x10 supine HEP supine   C/S Ret    Med ball BMB 2x10 w/ retraction    10x 2 seated w/ ext 10x seated    C/S Ret Stabilization Prone 2x10 w/ ext on elbows Prone 2x10 w/ ext on elbows Prone 2x10 w/ elbows           YTB 10x w/ extension-abduction,    YTB 2x10 w/ shoulder press         Prone on Elbows C/S rotation 2x10 ea    C/S SB 2x10 ea    C/S rotation w/ SB & ext 2x10 ea C/S rotation 2x10 ea    C/S SB 2x10 ea    C/S rotation w/ SB & Ext 2x10 ea           C/S Ret 10x5\" chin curls    2x10 chin curl with med ball rotation YMB 10x5\" chin curls                                     Ther Ex             Assessment & Management POC with reassess, focus on global C/S ROM POC on emphasis of full ROM.  POC with continued emphasis on global mobility. POC with prolonged extension inducing radicular symptoms. POC with emphasis on retraction and rotation POC with emphasis on continued repeated retraction/extension. POC with emphasis on centralization and monitoring response. POC with emphasis on gravity minimized retraction with careful monitoring of symptom response.   POC with emphasis on directional preference and centralization   Seated T/S Ext 10x5s    10x5s w/ rotation 10x5s           Sidelying T/S Rot             C/S SNAGs        L rot 2x10 L rot 2x10    C/S Ret w/ Rot     2x10 seated L 2x10 seated to L  10x     LS Str   5x10\" ea 5x10\" ea 3x10\" ea        UT Str   5x10\" " "ea 5x10\" ea                      Ther Activity                                       Gait Training                                       Modalities                                         "

## 2025-04-11 ENCOUNTER — OFFICE VISIT (OUTPATIENT)
Dept: OBGYN CLINIC | Facility: CLINIC | Age: 45
End: 2025-04-11
Payer: COMMERCIAL

## 2025-04-11 VITALS — BODY MASS INDEX: 32.96 KG/M2 | HEIGHT: 67 IN | WEIGHT: 210 LBS

## 2025-04-11 DIAGNOSIS — M54.12 LEFT CERVICAL RADICULOPATHY: Primary | ICD-10-CM

## 2025-04-11 PROCEDURE — 99213 OFFICE O/P EST LOW 20 MIN: CPT | Performed by: ORTHOPAEDIC SURGERY

## 2025-04-11 NOTE — PROGRESS NOTES
Assessment/Plan:  1. Left cervical radiculopathy  MRI cervical spine wo contrast          Liset has improved neck pain but still has persistent numbness into her hands that could be radicular in nature.  She continues to have no discomfort with any carpal tunnel testing.  I have recommended MRI of the cervical spine for further evaluation.  She will follow-up in the office after MRI is complete.  She may stop physical therapy at this time.      Subjective:   Liset Fox is a 44 y.o. female who presents to the office for follow-up for ongoing left-sided neck pain.  She initially presented to the office over 6 weeks ago with discomfort the neck rating down the arms.  She was getting intermittent episodes of numbness into the hand.  We started her in physical therapy and she states that therapy has significantly helped as far as the pain in her neck.  She still gets numbness and tingling to her hands but is not quite as severe.  She has some slight discomfort the neck but nowhere near as uncomfortable as she was experiencing.  She denies any new injury.      Review of Systems   Constitutional:  Negative for chills, fever and unexpected weight change.   HENT:  Negative for hearing loss, nosebleeds and sore throat.    Eyes:  Negative for pain, redness and visual disturbance.   Respiratory:  Negative for cough, shortness of breath and wheezing.    Cardiovascular:  Negative for chest pain, palpitations and leg swelling.   Gastrointestinal:  Negative for abdominal pain, nausea and vomiting.   Endocrine: Negative for polydipsia and polyuria.   Genitourinary:  Negative for dysuria and hematuria.   Musculoskeletal:         See HPI   Skin:  Negative for rash and wound.   Neurological:  Negative for dizziness, numbness and headaches.   Psychiatric/Behavioral:  Negative for decreased concentration and suicidal ideas. The patient is not nervous/anxious.          Past Medical History:   Diagnosis Date    Anxiety      Depression        Past Surgical History:   Procedure Laterality Date    KNEE SURGERY         Family History   Problem Relation Age of Onset    Cancer Mother         Passed away from brain cancer  at 64    Diabetes Mother         Type 1    Cancer Maternal Grandmother         Breast cancer    Cancer Paternal Grandmother         Recurring breast cancer    Cancer Maternal Aunt         Breast cancer and bone cancer       Social History     Occupational History    Not on file   Tobacco Use    Smoking status: Never    Smokeless tobacco: Never   Vaping Use    Vaping status: Never Used   Substance and Sexual Activity    Alcohol use: Yes    Drug use: Never    Sexual activity: Yes     Partners: Male     Birth control/protection: Male Sterilization         Current Outpatient Medications:     amLODIPine (NORVASC) 5 mg tablet, , Disp: , Rfl:     No Known Allergies    Objective:  There were no vitals filed for this visit.         Ortho Exam    Physical Exam  Vitals and nursing note reviewed.   Constitutional:       Appearance: Normal appearance. She is well-developed.   HENT:      Head: Normocephalic and atraumatic.      Right Ear: External ear normal.      Left Ear: External ear normal.   Eyes:      General: No scleral icterus.     Extraocular Movements: Extraocular movements intact.      Conjunctiva/sclera: Conjunctivae normal.   Neck:        Comments: Positive Spurling's maneuver on the left  Cardiovascular:      Rate and Rhythm: Normal rate.   Pulmonary:      Effort: Pulmonary effort is normal. No respiratory distress.   Musculoskeletal:      Cervical back: Normal range of motion and neck supple. Muscular tenderness present. No spinous process tenderness. Normal range of motion.      Comments: See Ortho exam   Skin:     General: Skin is warm and dry.   Neurological:      General: No focal deficit present.      Mental Status: She is alert and oriented to person, place, and time.   Psychiatric:         Behavior: Behavior normal.                This document was created using speech voice recognition software.   Grammatical errors, random word insertions, pronoun errors, and incomplete sentences are an occasional consequence of this system due to software limitations, ambient noise, and hardware issues.   Any formal questions or concerns about content, text, or information contained within the body of this dictation should be directly addressed to the provider for clarification.

## 2025-04-14 ENCOUNTER — OFFICE VISIT (OUTPATIENT)
Dept: PHYSICAL THERAPY | Facility: CLINIC | Age: 45
End: 2025-04-14
Payer: COMMERCIAL

## 2025-04-14 DIAGNOSIS — M54.2 NECK PAIN: ICD-10-CM

## 2025-04-14 DIAGNOSIS — M54.12 LEFT CERVICAL RADICULOPATHY: Primary | ICD-10-CM

## 2025-04-14 PROCEDURE — 97110 THERAPEUTIC EXERCISES: CPT | Performed by: PHYSICAL THERAPIST

## 2025-04-14 NOTE — PROGRESS NOTES
Daily Note     Today's date: 2025  Patient name: Liset Fox  : 1980  MRN: [id number]  Referring provider: Keon Bejarano DO  Dx:   Encounter Diagnosis     ICD-10-CM    1. Left cervical radiculopathy  M54.12       2. Neck pain  M54.2                      Subjective: Patient reports she is undergoing an MRI and this is her last session to refresh her exercises.      Objective: See treatment diary below      Assessment: Tolerated treatment well. Reviewed home exercises with emphasis on continued progression of global cervical mobility. Patient demonstrated fatigue post treatment and would benefit from continued PT      Plan: Continue per plan of care.  Progress treatment as tolerated.  Discharge to home exercise program.     Precautions:   Past Medical History:   Diagnosis Date    Anxiety     Depression         Insurance:  [place]/CMS Eval/ Re-eval Auth #/ Referral # Total units or visits Start date  Expiration date KX? Visit limitation?  PT only or  PT+OT? Co-Insurance   CMS 3/3/25   3/3/25                      POC Start Date POC Expiration Date Signed POC?   3/3/25 5/2/25    4/10/25 6/9/25       Date 3/3 3/5 3/10 3/13 3/17 3/20 3/27 3/31 4/7 4/10 4/14    Visits/Units:  Used 1 2 3 4 5 6 7 8 9 10 11    Authed:  Remaining                  [facility] Access:  Access Code: [email or web address]  URL: [email or web address]  Date: 2025  Prepared by: Marv Maurer    Exercises  - Supine Chin Tuck  - 4 x daily - 7 x weekly - 2 sets - 10 reps  - Seated Cervical Retraction  - 4 x daily - 7 x weekly - 2 sets - 10 reps    Monitor BP!    Manuals 4/14 4/10 4/7 3/31 3/27 3/20 3/17 3/13 3/10 3/5 3/3   C/S Traction    BR gr 1-2 BR gr 1-2 BR gr 1-2 BR gr 1-2 BR gr 1-2 BR gr 1-2 BR gr 1-2    C/S Retraction-Ext    10x w/ mild retraction 10x w/ mild retraction 10x w/ mild retraction 10x w/ mild retraction into extension 8x w/ mild retraction/traction                                  Neuro Re-Ed             "  C/S Ret  2x10 w/ ext 2x10 w/ ext 2x10 extension  2x10 w/ retraction w/ scapular retraction 2x10 w/ retraction w/ scapular retraction 2x10 w/ retraction w/ scapular retraction 2x10 w/ retraction w/ scapular retraction 2x10 supine 2x10 supine HEP supine   C/S Ret     Med ball BMB 2x10 w/ retraction    10x 2 seated w/ ext 10x seated    C/S Ret Stabilization  Prone 2x10 w/ ext on elbows Prone 2x10 w/ ext on elbows Prone 2x10 w/ elbows           YTB 10x w/ extension-abduction,    YTB 2x10 w/ shoulder press         Prone on Elbows  C/S rotation 2x10 ea    C/S SB 2x10 ea    C/S rotation w/ SB & ext 2x10 ea C/S rotation 2x10 ea    C/S SB 2x10 ea    C/S rotation w/ SB & Ext 2x10 ea           C/S Ret  10x5\" chin curls    2x10 chin curl with med ball rotation YMB 10x5\" chin curls                                       Ther Ex              Assessment & Management HEP review  with POC on global cervical ROM while awaiting MRI. POC with reassess, focus on global C/S ROM POC on emphasis of full ROM.  POC with continued emphasis on global mobility. POC with prolonged extension inducing radicular symptoms. POC with emphasis on retraction and rotation POC with emphasis on continued repeated retraction/extension. POC with emphasis on centralization and monitoring response. POC with emphasis on gravity minimized retraction with careful monitoring of symptom response.   POC with emphasis on directional preference and centralization   Seated T/S Ext  10x5s    10x5s w/ rotation 10x5s           Sidelying T/S Rot              C/S SNAGs         L rot 2x10 L rot 2x10    C/S Ret w/ Rot      2x10 seated L 2x10 seated to L  10x     LS Str    5x10\" ea 5x10\" ea 3x10\" ea        UT Str    5x10\" ea 5x10\" ea                       Ther Activity                                          Gait Training                                          Modalities                                                 "

## 2025-04-17 ENCOUNTER — APPOINTMENT (OUTPATIENT)
Dept: PHYSICAL THERAPY | Facility: CLINIC | Age: 45
End: 2025-04-17
Payer: COMMERCIAL

## 2025-04-18 ENCOUNTER — RESULTS FOLLOW-UP (OUTPATIENT)
Dept: OBGYN CLINIC | Facility: CLINIC | Age: 45
End: 2025-04-18

## 2025-04-21 NOTE — TELEPHONE ENCOUNTER
Caller: Patient     Doctor: Dr. Bejarano    Reason for call: Patient called back to discuss MRI results. Please advise.    Call back#: [phone]

## 2025-04-21 NOTE — TELEPHONE ENCOUNTER
Lvm for pt to cb and switch fu to spine and pain   ----- Message from Keon Bejarano DO sent at 4/18/2025  4:23 PM EDT -----  Liset had an MRI of her cervical spine which showed spinal stenosis at C5-6.  I would like for her to see spine and pain/numbness to consider an injection at this level if her pain persists.

## 2025-04-28 ENCOUNTER — APPOINTMENT (OUTPATIENT)
Dept: PHYSICAL THERAPY | Facility: CLINIC | Age: 45
End: 2025-04-28
Payer: COMMERCIAL

## 2025-05-08 ENCOUNTER — CONSULT (OUTPATIENT)
Age: 45
End: 2025-05-08
Payer: COMMERCIAL

## 2025-05-08 VITALS — BODY MASS INDEX: 30.61 KG/M2 | HEIGHT: 67 IN | WEIGHT: 195 LBS

## 2025-05-08 DIAGNOSIS — G56.03 CARPAL TUNNEL SYNDROME, BILATERAL: ICD-10-CM

## 2025-05-08 DIAGNOSIS — M54.12 CERVICAL RADICULOPATHY: Primary | ICD-10-CM

## 2025-05-08 PROCEDURE — 99203 OFFICE O/P NEW LOW 30 MIN: CPT | Performed by: STUDENT IN AN ORGANIZED HEALTH CARE EDUCATION/TRAINING PROGRAM

## 2025-05-08 NOTE — PROGRESS NOTES
Assessment:  1. Cervical radiculopathy    2. Carpal tunnel syndrome, bilateral        Plan:  No orders of the defined types were placed in this encounter.      No orders of the defined types were placed in this encounter.      My impressions and treatment recommendations were discussed in detail with the patient, who verbalized understanding and had no further questions.    Liset presents today for evaluation and management of neck pain and bilateral hand numbness.  Independently interpreted the patient's cervical MRI showing mild degenerative changes C5-6 with  left neuroforaminal stenosis due to facet arthropathy.  This does appear to correlate with the patient's cervical radiculopathy which has resolved with conservative care.  The patient's bilateral hand numbness appears to be more related to carpal tunnel syndrome.  We did discuss conservative care including neutral wrist splints to be worn at nighttime.  We did also discuss if failure to improve we could consider electrodiagnostic testing to evaluate degree of median nerve entrapment.  Given intermittent paresthesias with trace weakness of the APB her symptoms are mild and not interfering with her function at this time.  If her symptoms were to worsen consideration of neuropathic pain agents and or carpal tunnel injections as well as surgical release should conservative measures fail.  At this time the patient is not significantly limited and would recommend splinting.  Patient can follow-up as needed should her symptoms worsen and return.    Follow-up is planned as needed or sooner as warranted.  Discharge instructions were provided. I personally saw and examined the patient and I agree with the above discussed plan of care.    I have spent a total time of 42 minutes in caring for this patient on the day of the visit/encounter including Diagnostic results, Prognosis, Risks and benefits of tx options, Instructions for management, Impressions, Documenting in  the medical record, Reviewing/placing orders in the medical record (including tests, medications, and/or procedures), and Obtaining or reviewing history  .     History of Present Illness:    Liset Fox is a 44 y.o. female who presents to [facility] for initial evaluation of the above stated pain complaints. The patient has a past medical and chronic pain history as outlined in the assessment section. She was referred by No referring provider defined for this encounter. .    44-year-old female with past medical history of hypertension presents today for evaluation and management of neck pain and numbness has been present for roughly 4 months.  She works as a .  She denies any specific inciting event.  She reports her pain is rated as mild and currently 0 out of 10.  She denies any interference with daily activities.  Her pain is intermittent worse at nighttime associated with numbness.  Her pain is in her neck radiating into her left shoulder and arm and reports bilateral hand numbness.  Her pain is not made better or worse with any particular activities.  She has had x-ray relief with physical therapy and therapeutic exercise.  She is currently not taking any medications.  She presents today for further evaluation.    Review of Systems:    Review of Systems   Constitutional:  Negative for fever and unexpected weight change.   HENT:  Negative for trouble swallowing.    Eyes:  Negative for visual disturbance.   Respiratory:  Negative for chest tightness, shortness of breath and wheezing.    Cardiovascular:  Negative for chest pain and palpitations.   Gastrointestinal:  Negative for constipation, diarrhea, nausea and vomiting.   Endocrine: Negative for cold intolerance, heat intolerance and polydipsia.   Genitourinary:  Negative for difficulty urinating and frequency.   Musculoskeletal:  Positive for back pain and joint swelling. Negative for arthralgias, gait problem,  "myalgias, neck pain and neck stiffness.   Skin:  Negative for rash.   Neurological:  Positive for numbness. Negative for dizziness, seizures, syncope, weakness, light-headedness and headaches.   Hematological:  Does not bruise/bleed easily.   Psychiatric/Behavioral:  Positive for sleep disturbance. Negative for dysphoric mood.    All other systems reviewed and are negative.          Past Medical History:   Diagnosis Date    Anxiety     Depression        Past Surgical History:   Procedure Laterality Date    KNEE SURGERY         Family History   Problem Relation Age of Onset    Cancer Mother         Passed away from brain cancer  at 64    Diabetes Mother         Type 1    Cancer Maternal Grandmother         Breast cancer    Cancer Paternal Grandmother         Recurring breast cancer    Cancer Maternal Aunt         Breast cancer and bone cancer       Social History     Occupational History    Not on file   Tobacco Use    Smoking status: Never    Smokeless tobacco: Never   Vaping Use    Vaping status: Never Used   Substance and Sexual Activity    Alcohol use: Yes    Drug use: Never    Sexual activity: Yes     Partners: Male     Birth control/protection: Male Sterilization         Current Outpatient Medications:     amLODIPine (NORVASC) 5 mg tablet, , Disp: , Rfl:     No Known Allergies    Physical Exam:    Ht 5' 7\" (1.702 m)   Wt 88.5 kg (195 lb)   BMI 30.54 kg/m²     Constitutional: normal, well developed, well nourished, alert, in no distress and non-toxic and no overt pain behavior.  Eyes: anicteric  HEENT: grossly intact  Neck: supple, symmetric, trachea midline and no masses   Pulmonary:even and unlabored  Cardiovascular:No edema or pitting edema present  Skin:Normal without rashes or lesions and well hydrated  Psychiatric:Mood and affect appropriate  Neurologic:Cranial Nerves II-XII grossly intact  Musculoskeletal: Range of motion cervical spine is full.  No tenderness cervical paraspinals.  Negative Spurling's. "  Strength preserved in the upper limbs with the exception of 4 out of 5 strength with the bilateral APB's.  Tinel's negative at the wrist bilaterally but positive at the right elbow.    Imaging       External imaging  Status: Final result     PACS Images - GE     Show images for External imaging  PACS Images - [facility]     Show images for External imaging    External imaging: Result Notes     Keon Anastacio Bejarano DO  4/18/2025  4:23 PM EDT       Liset had an MRI of her cervical spine which showed spinal stenosis at C5-6.  I would like for her to see spine and pain/numbness to consider an injection at this level if her pain persists.            Scans on Order [id number]    Radiology Results Ext - Scan on 4/18/2025 12:53 PM: Radiology        Imaging    External imaging (Order: [id number]) - 4/18/2025  Order Report     Order Details  Result Information    Status Priority Source   Final result (4/18/2025 12:53 PM)         External imaging: Result Notes     Keon Chaves Bejarano,   4/18/2025  4:23 PM EDT         Liset had an MRI of her cervical spine which showed spinal stenosis at C5-6.  I would like for her to see spine and pain/numbness to consider an injection at this level if her pain persists.            All Reviewers List    Cammie Ordoñez MA on 4/21/2025  1:16 PM   Cammie Ordoñez MA on 4/21/2025  9:13 AM   Cammie Ordoñez MA on 4/21/2025  7:49 AM   Keon Bejarano DO on 4/18/2025  4:23 PM       External imaging: Patient Communication     Add Comments   Seen         Exam Information    Status Exam Begun  Exam Ended  Performing Tech   Final [99]                  External Results Report    Open External Results Report      Encounter    View Encounter                       Pre-op Summary    Pre-op             Recovery Summary    Recovery                 Routing History    None         No orders to display       No orders of the defined types were placed in this encounter.